# Patient Record
Sex: FEMALE | Race: WHITE | NOT HISPANIC OR LATINO | Employment: UNEMPLOYED | URBAN - NONMETROPOLITAN AREA
[De-identification: names, ages, dates, MRNs, and addresses within clinical notes are randomized per-mention and may not be internally consistent; named-entity substitution may affect disease eponyms.]

---

## 2017-03-27 ENCOUNTER — APPOINTMENT (OUTPATIENT)
Dept: LAB | Facility: HOSPITAL | Age: 57
End: 2017-03-27

## 2017-03-27 ENCOUNTER — TRANSCRIBE ORDERS (OUTPATIENT)
Dept: ADMINISTRATIVE | Facility: HOSPITAL | Age: 57
End: 2017-03-27

## 2017-03-27 DIAGNOSIS — R14.2 ERUCTATION: ICD-10-CM

## 2017-03-27 DIAGNOSIS — E66.9 OBESITY, UNSPECIFIED: ICD-10-CM

## 2017-03-27 DIAGNOSIS — I10 ESSENTIAL HYPERTENSION, MALIGNANT: Primary | ICD-10-CM

## 2017-03-27 DIAGNOSIS — E78.5 HYPERLIPIDEMIA, UNSPECIFIED HYPERLIPIDEMIA TYPE: ICD-10-CM

## 2017-03-27 LAB
ALBUMIN SERPL-MCNC: 4.4 G/DL (ref 3.5–5)
ALBUMIN/GLOB SERPL: 1.4 G/DL (ref 1.1–2.5)
ALP SERPL-CCNC: 78 U/L (ref 24–120)
ALT SERPL W P-5'-P-CCNC: 16 U/L (ref 0–54)
ANION GAP SERPL CALCULATED.3IONS-SCNC: 12 MMOL/L (ref 4–13)
ARTICHOKE IGE QN: 133 MG/DL (ref 0–99)
AST SERPL-CCNC: 20 U/L (ref 7–45)
BASOPHILS # BLD AUTO: 0.03 10*3/MM3 (ref 0–0.2)
BASOPHILS NFR BLD AUTO: 0.4 % (ref 0–2)
BILIRUB SERPL-MCNC: 0.4 MG/DL (ref 0.1–1)
BUN BLD-MCNC: 18 MG/DL (ref 5–21)
BUN/CREAT SERPL: 23.1 (ref 7–25)
CALCIUM SPEC-SCNC: 10.4 MG/DL (ref 8.4–10.4)
CHLORIDE SERPL-SCNC: 100 MMOL/L (ref 98–110)
CHOLEST SERPL-MCNC: 240 MG/DL (ref 130–200)
CO2 SERPL-SCNC: 31 MMOL/L (ref 24–31)
CREAT BLD-MCNC: 0.78 MG/DL (ref 0.5–1.4)
DEPRECATED RDW RBC AUTO: 41.9 FL (ref 40–54)
EOSINOPHIL # BLD AUTO: 0.11 10*3/MM3 (ref 0–0.7)
EOSINOPHIL NFR BLD AUTO: 1.6 % (ref 0–4)
ERYTHROCYTE [DISTWIDTH] IN BLOOD BY AUTOMATED COUNT: 13.6 % (ref 12–15)
GFR SERPL CREATININE-BSD FRML MDRD: 77 ML/MIN/1.73
GLOBULIN UR ELPH-MCNC: 3.1 GM/DL
GLUCOSE BLD-MCNC: 95 MG/DL (ref 70–100)
HCT VFR BLD AUTO: 38.8 % (ref 37–47)
HDLC SERPL-MCNC: 55 MG/DL
HGB BLD-MCNC: 13.2 G/DL (ref 12–16)
LDLC/HDLC SERPL: 2.46 {RATIO}
LYMPHOCYTES # BLD AUTO: 2.89 10*3/MM3 (ref 0.72–4.86)
LYMPHOCYTES NFR BLD AUTO: 43.3 % (ref 15–45)
MCH RBC QN AUTO: 29.9 PG (ref 28–32)
MCHC RBC AUTO-ENTMCNC: 34 G/DL (ref 33–36)
MCV RBC AUTO: 87.8 FL (ref 82–98)
MONOCYTES # BLD AUTO: 0.4 10*3/MM3 (ref 0.19–1.3)
MONOCYTES NFR BLD AUTO: 6 % (ref 4–12)
NEUTROPHILS # BLD AUTO: 3.24 10*3/MM3 (ref 1.87–8.4)
NEUTROPHILS NFR BLD AUTO: 48.7 % (ref 39–78)
PLATELET # BLD AUTO: 181 10*3/MM3 (ref 130–400)
PMV BLD AUTO: 11.2 FL (ref 6–12)
POTASSIUM BLD-SCNC: 4.8 MMOL/L (ref 3.5–5.3)
PROT SERPL-MCNC: 7.5 G/DL (ref 6.3–8.7)
RBC # BLD AUTO: 4.42 10*6/MM3 (ref 4.2–5.4)
SODIUM BLD-SCNC: 143 MMOL/L (ref 135–145)
TRIGL SERPL-MCNC: 248 MG/DL (ref 0–149)
TSH SERPL DL<=0.05 MIU/L-ACNC: 2.45 MIU/ML (ref 0.47–4.68)
WBC NRBC COR # BLD: 6.67 10*3/MM3 (ref 4.8–10.8)

## 2017-03-27 PROCEDURE — 80050 GENERAL HEALTH PANEL: CPT | Performed by: FAMILY MEDICINE

## 2017-03-27 PROCEDURE — 36415 COLL VENOUS BLD VENIPUNCTURE: CPT | Performed by: FAMILY MEDICINE

## 2017-03-27 PROCEDURE — 80061 LIPID PANEL: CPT | Performed by: FAMILY MEDICINE

## 2020-06-22 ENCOUNTER — TRANSCRIBE ORDERS (OUTPATIENT)
Dept: PHYSICAL THERAPY | Facility: CLINIC | Age: 60
End: 2020-06-22

## 2020-06-22 DIAGNOSIS — M76.62 ACHILLES TENDINITIS OF LEFT LOWER EXTREMITY: Primary | ICD-10-CM

## 2020-06-23 ENCOUNTER — TREATMENT (OUTPATIENT)
Dept: PHYSICAL THERAPY | Facility: CLINIC | Age: 60
End: 2020-06-23

## 2020-06-23 DIAGNOSIS — M76.62 ACHILLES TENDINITIS OF LEFT LOWER EXTREMITY: Primary | ICD-10-CM

## 2020-06-23 PROCEDURE — 97161 PT EVAL LOW COMPLEX 20 MIN: CPT | Performed by: PHYSICAL THERAPIST

## 2020-06-23 PROCEDURE — 97140 MANUAL THERAPY 1/> REGIONS: CPT | Performed by: PHYSICAL THERAPIST

## 2020-06-23 NOTE — PROGRESS NOTES
Outpatient Physical Therapy Ortho Initial Evaluation       Patient Name: Taylor Lopez  : 1960  MRN: [id number]  Today's Date: 2020      Visit Date: 2020    Patient Active Problem List   Diagnosis   • Acute dermatitis        Past Medical History:   Diagnosis Date   • Anxiety    • Elevated cholesterol    • Gallbladder abscess    • Hypertension         Past Surgical History:   Procedure Laterality Date   • CHOLECYSTECTOMY     • FRACTURE SURGERY     • OVARIAN CYST DRAINAGE/EXCISION         Visit Dx:     ICD-10-CM ICD-9-CM   1. Achilles tendinitis of left lower extremity M76.62 726.71         Patient History     Row Name 20 1300             History    Chief Complaint  Pain;Difficulty Walking  -WJ      Type of Pain  Ankle pain  -WJ      Brief Description of Current Complaint  Pt reports that she went to [facility] yesterday due to the pain in her L foot/ankle. She reports that about 2 weeks ago her dog kicked her in the back of the ankle. She reports that after going to the orthopedic doctor they diagnosed her with achilles tendinitis. She presents in a CAM boot that she got yesterday.  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  X-ray  -WJ      Results of Clinical Tests  negative   -WJ      Related/Recent Hospitalizations  No  -WJ         Pain     Pain Location  Ankle  -WJ      Pain at Present  3  -WJ      Pain at Best  3  -WJ      Pain at Worst  8  -WJ      Pain Frequency  Constant/continuous  -WJ      Pain Description  Sharp  -WJ      What Performance Factors Make the Current Problem(s) WORSE?  walking, going onto toes stretching  -WJ      What Performance Factors Make the Current Problem(s) BETTER?  CAM boot, ibuprofen   -WJ         Fall Risk Assessment    Any falls in the past year:  No  -WJ         Daily Activities    Pt Participated in POC and Goals  Yes  -WJ        User Key  (r) =  Recorded By, (t) = Taken By, (c) = Cosigned By    Initials Name Provider Type    WJ Terrance Tomlin, PT DPT Physical Therapist          PT Ortho     Row Name 06/23/20 1400       Posture/Observations    Posture/Observations Comments  slightly increased weight bearing RLE; CAM boot on R  -WJ       Quarter Clearing    Quarter Clearing  Lower Quarter Clearing  -WJ       DTR- Lower Quarter Clearing    Patellar tendon (L2-4)  Bilateral:;1- Minimal response  -WJ    Achilles tendon (S1-2)  Bilateral:;1- Minimal response  -WJ       Sensory Screen for Light Touch- Lower Quarter Clearing    L1 (inguinal area)  Bilateral:;Intact  -WJ    L2 (anterior mid thigh)  Bilateral:;Intact  -WJ    L3 (distal anterior thigh)  Bilateral:;Intact  -WJ    L4 (medial lower leg/foot)  Bilateral:;Intact  -WJ    L5 (lateral lower leg/great toe)  Bilateral:;Intact  -WJ    S1 (bottom of foot)  Bilateral:;Intact  -WJ       Myotomal Screen- Lower Quarter Clearing    Hip flexion (L2)  Bilateral:;5 (Normal)  -WJ    Knee extension (L3)  Bilateral:;5 (Normal)  -WJ    Ankle DF (L4)  Right:;5 (Normal);Left:;4 (Good)  -WJ    Ankle PF (S1)  Bilateral:;5 (Normal)  -WJ    Knee flexion (S2)  Bilateral:;5 (Normal)  -WJ       Special Tests/Palpation    Special Tests/Palpation  Ankle/Foot  -WJ       Foot/Ankle Palpation    Foot/Ankle Palpation?  Yes  -WJ    Medial Gastroc  Left:;Tender  -WJ    Lateral Gastroc  Left:;Tender  -WJ    Achilles' Tendon  Left:;Tender thickened  -WJ       Ankle Accessory Motions    Ankle Accessory Motions Tested?  Yes  -WJ    Talocrural (talotibial) A-P glide  Left:;Hypomobile  -WJ       General ROM    RT Lower Ext  Rt Ankle Dorsiflexion;Rt Ankle Plantarflexion;Rt Ankle Inversion;Rt Ankle Eversion  -WJ    LT Lower Ext  Lt Ankle Dorsiflexion;Lt Ankle Plantarflexion;Lt Ankle Inversion;Lt Ankle Eversion  -WJ       Right Lower Ext    Rt Ankle Dorsiflexion AROM  12  -WJ    Rt Ankle Plantarflexion AROM  46  -WJ    Rt Ankle Inversion AROM  42   -WJ    Rt Ankle Eversion AROM  14  -WJ       Left Lower Ext    Lt Ankle Dorsiflexion AROM  4  -WJ    Lt Ankle Plantarflexion AROM  38  -WJ    Lt Ankle Inversion AROM  20  -WJ    Lt Ankle Eversion AROM  18  -WJ       MMT (Manual Muscle Testing)    Rt Lower Ext  Rt Hip ABduction  -WJ    Lt Lower Ext  Lt Hip ABduction  -WJ       MMT Right Lower Ext    Rt Hip ABduction MMT, Gross Movement  (3+/5) fair plus  -WJ       MMT Left Lower Ext    Lt Hip ABduction MMT, Gross Movement  (3/5) fair  -WJ       Gait/Stairs Assessment/Training    Comment (Gait/Stairs)  Pt ambulates with a CAM boot on the L with increased pelvic sway  -WJ      User Key  (r) = Recorded By, (t) = Taken By, (c) = Cosigned By    Initials Name Provider Type    WTerrance Hill, PT DPT Physical Therapist                      Therapy Education  Education Details: Access code:[email or web address] ankle alphabet and seated gastroc strtech   Given: HEP, Symptoms/condition management, Posture/body mechanics, Fall prevention and home safety, Mobility training  Program: New  How Provided: Verbal, Demonstration  Provided to: Patient  Level of Understanding: Verbalized, Demonstrated     PT OP Goals     Row Name 06/23/20 1500          PT Short Term Goals    STG Date to Achieve  07/14/20  -WJ     STG 1  Pt is able to demonstrate proper weight shifting wihtout CAM boot.  -WJ     STG 1 Progress  New  -WJ     STG 2  Pt to dmeonstrate L active DF of 10 deg.  -WJ     STG 2 Progress  New  -W        Long Term Goals    LTG Date to Achieve  08/04/20  -WJ     LTG 1  Pt to demonstrate independence with comprehensive HEP.  -WJ     LTG 1 Progress  New  -WJ     LTG 2  Pt to demonstrate B hip abductor strength of 4/5.  -WJ     LTG 2 Progress  New  -WJ     LTG 3  Pt to score <10 on the LEFS.  -WJ     LTG 3 Progress  New  -WJ     LTG 4  Pt is able to ambulate community distances without the CAM boot while demonstrating good gait mechanics.  -WJ     LTG 4 Progress  New  -W        Time  Calculation    PT Goal Re-Cert Due Date  07/23/20  -       User Key  (r) = Recorded By, (t) = Taken By, (c) = Cosigned By    Initials Name Provider Type    WTerrance Hill, PT DPT Physical Therapist          PT Assessment/Plan     Row Name 06/23/20 1500          PT Assessment    Functional Limitations  Impaired gait;Impaired locomotion;Limitation in home management;Limitations in community activities;Performance in leisure activities  -     Impairments  Pain;Muscle strength;Range of motion;Joint mobility;Impaired muscle endurance  -W     Assessment Comments  Ms. Lopez presents to the clinic today with a chief complaint L Achilles pain onset approx 2 weeks following getting kicked by her dog. She went to [facility] yesterday and they placed her in a CAM boot in which she reports has helped her mobility. Evlaution revealed mild thickening of the tendon on the L compared to the R along with decreased ROM. There is also bilateral hip weakness placing increased strees throughout the lower extremities. Skilled physcial therapy is inidcated to address her ROM, flexibility, strength and gait mechanics. Thank you for this referral.  -WJ     Rehab Potential  Good  -     Patient/caregiver participated in establishment of treatment plan and goals  Yes  -WJ     Patient would benefit from skilled therapy intervention  Yes  -WJ        PT Plan    PT Frequency  2x/week  -W     Predicted Duration of Therapy Intervention (Therapy Eval)  6 weeks  -W     Planned CPT's?  PT EVAL LOW COMPLEXITY: 19362;PT RE-EVAL: 70567;PT THER PROC EA 15 MIN: 60641;PT THER ACT EA 15 MIN: 96324;PT MANUAL THERAPY EA 15 MIN: 03534;PT NEUROMUSC RE-EDUCATION EA 15 MIN: 24564;PT GAIT TRAINING EA 15 MIN: 88030;PT SELF CARE/HOME MGMT/TRAIN EA 15: 66196;PT ELECTRICAL STIM UNATTEND: ;PT ELECTRICAL STIM ATTD EA 15 MIN: 59494;PT ULTRASOUND EA 15 MIN: 24669;PT HOT/COLD PACK WC NONMCARE: 28450  -W     PT Plan Comments  We will initially work on her soft  tissue restrictions and L ankle mobility. We will progress with hip strengtheing and address her gait mechaincs slowly weaning off of the boot. An HEP will be developed progressing towards independence upon discharge.  -WJ       User Key  (r) = Recorded By, (t) = Taken By, (c) = Cosigned By    Initials Name Provider Type    Terrance Rea, PT DPT Physical Therapist          Modalities     Row Name 06/23/20 1500             ELECTRICAL STIMULATION    Attended/Unattended  Attended  -WJ      Stimulation Type  -- LaserStim   -WJ      Max mAmp  -- chronic inflammation   -WJ      Location/Electrode Placement/Other  Achilles/gastroc  -WJ      69474 - PT E-Stim Attended (Manual) Minutes  5  -WJ        User Key  (r) = Recorded By, (t) = Taken By, (c) = Cosigned By    Initials Name Provider Type    Terrance Rea, PT DPT Physical Therapist        OP Exercises     Row Name 06/23/20 1500             Total Minutes    64558 - PT Manual Therapy Minutes  10  -WJ        User Key  (r) = Recorded By, (t) = Taken By, (c) = Cosigned By    Initials Name Provider Type    Terrance Rea, PT DPT Physical Therapist           Manual Rx (last 36 hours)      Manual Treatments     Row Name 06/23/20 1500             Total Minutes    21765 - PT Manual Therapy Minutes  10  -WJ         Manual Rx 1    Manual Rx 1 Location  L achilles and gastroc  -WJ      Manual Rx 1 Type  IASTM with edge tool   -WJ      Manual Rx 1 Grade  mod   -WJ        User Key  (r) = Recorded By, (t) = Taken By, (c) = Cosigned By    Initials Name Provider Type    Terrance Rea, PT DPT Physical Therapist                      Outcome Measure Options: Lower Extremity Functional Scale (LEFS)  Lower Extremity Functional Index  Any of your usual work, housework or school activities: Quite a bit of difficulty  Your usual hobbies, recreational or sporting activities: Quite a bit of difficulty  Getting into or out of the bath: Moderate difficulty  Walking between rooms:  Moderate difficulty  Putting on your shoes or socks: A little bit of difficulty  Squatting: Extreme difficulty or unable to perform activity  Lifting an object, like a bag of groceries from the floor: No difficulty  Performing light activities around your home: Moderate difficulty  Performing heavy activities around your home: Moderate difficulty  Getting into or out of a car: Moderate difficulty  Walking 2 blocks: Quite a bit of difficulty  Walking a mile: Quite a bit of difficulty  Going up or down 10 stairs (about 1 flight of stairs): Quite a bit of difficulty  Standing for 1 hour: Moderate difficulty  Sitting for 1 hour: No difficulty  Running on even ground: Extreme difficulty or unable to perform activity  Running on uneven ground: Extreme difficulty or unable to perform activity  Making sharp turns while running fast: Extreme difficulty or unable to perform activity  Hopping: Extreme difficulty or unable to perform activity  Rolling over in bed: No difficulty  Total: 32      Time Calculation:               PT G-Codes  Outcome Measure Options: Lower Extremity Functional Scale (LEFS)  Total: 32         Terrance Tomlin, PT DPT  6/23/2020

## 2020-06-25 ENCOUNTER — TREATMENT (OUTPATIENT)
Dept: PHYSICAL THERAPY | Facility: CLINIC | Age: 60
End: 2020-06-25

## 2020-06-25 DIAGNOSIS — M76.62 ACHILLES TENDINITIS OF LEFT LOWER EXTREMITY: Primary | ICD-10-CM

## 2020-06-25 PROCEDURE — 97110 THERAPEUTIC EXERCISES: CPT | Performed by: PHYSICAL THERAPIST

## 2020-06-25 PROCEDURE — 97140 MANUAL THERAPY 1/> REGIONS: CPT | Performed by: PHYSICAL THERAPIST

## 2020-06-25 PROCEDURE — 97032 APPL MODALITY 1+ESTIM EA 15: CPT | Performed by: PHYSICAL THERAPIST

## 2020-06-25 NOTE — PROGRESS NOTES
Outpatient Physical Therapy Ortho Treatment Note       Patient Name: Taylor Lopez  : 1960  MRN: [id number]  Today's Date: 2020      Visit Date: 2020    Visit Dx:    ICD-10-CM ICD-9-CM   1. Achilles tendinitis of left lower extremity M76.62 726.71       Patient Active Problem List   Diagnosis   • Acute dermatitis        Past Medical History:   Diagnosis Date   • Anxiety    • Elevated cholesterol    • Gallbladder abscess    • Hypertension         Past Surgical History:   Procedure Laterality Date   • CHOLECYSTECTOMY     • FRACTURE SURGERY     • OVARIAN CYST DRAINAGE/EXCISION                         PT Assessment/Plan     Row Name 20 0900          PT Assessment    Assessment Comments  Today was her first visit since the initial eval therefore no goals have been met at this time. We focused today on ankle mobility along with her soft tissue restrictions. She had a couple of adhesions along the medial side of her gastroc.  -WJ        PT Plan    PT Plan Comments  Continue to address her soft iissue restrictions and joinbt mobility.  -WJ       User Key  (r) = Recorded By, (t) = Taken By, (c) = Cosigned By    Initials Name Provider Type    Terrance Rea, PT DPT Physical Therapist          Modalities     Row Name 20             ELECTRICAL STIMULATION    Attended/Unattended  Attended  -WJ      Stimulation Type  -- LaserStim  -WJ      Max mAmp  -- chronic inflammation  -WJ      Location/Electrode Placement/Other  Achilles/gastroc  -WJ      30476 - PT E-Stim Attended (Manual) Minutes  10  -WJ        User Key  (r) = Recorded By, (t) = Taken By, (c) = Cosigned By    Initials Name Provider Type    Terrance Rea, PT DPT Physical Therapist        OP Exercises     Row Name 20 08          Subjective Comments    Subjective Comments  --  Pt reports compliance with HEP and reports that as long as she has the boot on her pain is controlled.  -WJ        Subjective Pain     Able to rate subjective pain?  --  yes  -WJ     Pre-Treatment Pain Level  --  2  -WJ        Total Minutes    61824 - PT Therapeutic Exercise Minutes  10  -WJ  --     80093 - PT Manual Therapy Minutes  25  -WJ  --        Exercise 1    Exercise Name 1  --  L gastroc stretch  -WJ     Cueing 1  --  Verbal;Tactile  -WJ     Reps 1  --  3  -WJ     Time 1  --  30 sec each  -WJ        Exercise 2    Exercise Name 2  --  ankle 4-way with YTB  -WJ     Cueing 2  --  Verbal;Tactile  -WJ     Sets 2  --  2  -WJ     Reps 2  --  10  -WJ       User Key  (r) = Recorded By, (t) = Taken By, (c) = Cosigned By    Initials Name Provider Type    Terrance Rea, PT DPT Physical Therapist                      Manual Rx (last 36 hours)      Manual Treatments     Row Name 06/25/20 0900             Total Minutes    18712 - PT Manual Therapy Minutes  25  -WJ         Manual Rx 1    Manual Rx 1 Location  L achilles and gastroc  -WJ      Manual Rx 1 Type  IASTM with edge tool   -WJ      Manual Rx 1 Grade  mod   -WJ         Manual Rx 2    Manual Rx 2 Location  L TC  -WJ      Manual Rx 2 Type  dorsal glide  -WJ         Manual Rx 3    Manual Rx 3 Location  L TC  -WJ      Manual Rx 3 Type  distraction  -WJ        User Key  (r) = Recorded By, (t) = Taken By, (c) = Cosigned By    Initials Name Provider Type    Terrance Rea, PT DPT Physical Therapist          PT OP Goals     Row Name 06/25/20 0846          PT Short Term Goals    STG Date to Achieve  07/14/20  -WJ     STG 1  Pt is able to demonstrate proper weight shifting wihtout CAM boot.  -WJ     STG 1 Progress  Ongoing  -WJ     STG 2  Pt to dmeonstrate L active DF of 10 deg.  -WJ     STG 2 Progress  Ongoing  -WJ        Long Term Goals    LTG Date to Achieve  08/04/20  -WJ     LTG 1  Pt to demonstrate independence with comprehensive HEP.  -WJ     LTG 1 Progress  Ongoing  -WJ     LTG 2  Pt to demonstrate B hip abductor strength of 4/5.  -WJ     LTG 2 Progress  Ongoing  -WJ     LTG 3  Pt to  score <10 on the LEFS.  -WJ     LTG 3 Progress  Ongoing  -WJ     LTG 4  Pt is able to ambulate community distances without the CAM boot while demonstrating good gait mechanics.  -WJ     LTG 4 Progress  Ongoing  -WJ        Time Calculation    PT Goal Re-Cert Due Date  06/23/20  -WJ       User Key  (r) = Recorded By, (t) = Taken By, (c) = Cosigned By    Initials Name Provider Type    Terrance Rea, PT DPT Physical Therapist          Therapy Education  Given: HEP, Symptoms/condition management, Posture/body mechanics, Fall prevention and home safety, Mobility training  Program: Reinforced  How Provided: Verbal, Demonstration  Provided to: Patient  Level of Understanding: Verbalized, Demonstrated              Time Calculation:   Total Timed Code Minutes- PT: 45 minute(s)                Terrance Tomlin, PT DPT  6/25/2020

## 2020-06-30 ENCOUNTER — TREATMENT (OUTPATIENT)
Dept: PHYSICAL THERAPY | Facility: CLINIC | Age: 60
End: 2020-06-30

## 2020-06-30 DIAGNOSIS — M76.62 ACHILLES TENDINITIS OF LEFT LOWER EXTREMITY: Primary | ICD-10-CM

## 2020-06-30 PROCEDURE — 97032 APPL MODALITY 1+ESTIM EA 15: CPT | Performed by: PHYSICAL THERAPIST

## 2020-06-30 PROCEDURE — 97140 MANUAL THERAPY 1/> REGIONS: CPT | Performed by: PHYSICAL THERAPIST

## 2020-06-30 PROCEDURE — 97110 THERAPEUTIC EXERCISES: CPT | Performed by: PHYSICAL THERAPIST

## 2020-06-30 NOTE — PROGRESS NOTES
Outpatient Physical Therapy Ortho Treatment Note       Patient Name: Taylor Lopez  : 1960  MRN: [id number]  Today's Date: 2020      Visit Date: 2020    Visit Dx:    ICD-10-CM ICD-9-CM   1. Achilles tendinitis of left lower extremity M76.62 726.71       Patient Active Problem List   Diagnosis   • Acute dermatitis        Past Medical History:   Diagnosis Date   • Anxiety    • Elevated cholesterol    • Gallbladder abscess    • Hypertension         Past Surgical History:   Procedure Laterality Date   • CHOLECYSTECTOMY     • FRACTURE SURGERY     • OVARIAN CYST DRAINAGE/EXCISION                         PT Assessment/Plan     Row Name 20 0846       PT Assessment    Assessment Comments  We continued to utilize LASERstim to promote healing in her L achilles, work to reduce guarding in her medial gastrocs, and increase L ankle ROM. We also began light hip abd strengthening which she did very well with.   -  --  -       PT Plan    PT Plan Comments  Continue to increase L ankle ROM & decrease soft tissue restrictions. Progress hip strengthening and consider light WBing activities w/out boot.   -  --  -      User Key  (r) = Recorded By, (t) = Taken By, (c) = Cosigned By    Initials Name Provider Type    Michela Dolan PTA Physical Therapy Assistant          Modalities     Row Name 20             ELECTRICAL STIMULATION    Attended/Unattended  Attended  -      Stimulation Type  -- LASERstim  -      Max mAmp  -- chronic-inflammation mode  -      Location/Electrode Placement/Other  L achilles/distal gastroc  -      21623 - PT E-Stim Attended (Manual) Minutes  10  -        User Key  (r) = Recorded By, (t) = Taken By, (c) = Cosigned By    Initials Name Provider Type    Michela Dolan PTA Physical Therapy Assistant        OP Exercises     Row Name 20             Subjective Comments    Subjective Comments  Pt. feels a little better. She walked  around her house a little last night without the boot.   -         Subjective Pain    Able to rate subjective pain?  yes  -      Pre-Treatment Pain Level  2  -      Post-Treatment Pain Level  2  -         Total Minutes    62747 - PT Therapeutic Exercise Minutes  18  -AH      45949 - PT Manual Therapy Minutes  12  -AH         Exercise 1    Exercise Name 1  L gastroc/soleus stretches in prone  -      Cueing 1  Verbal;Tactile  -      Time 1  3 min each  -         Exercise 2    Exercise Name 2  BKFO  -      Cueing 2  Verbal;Tactile  -      Sets 2  2  -AH      Reps 2  10  -AH         Exercise 3    Exercise Name 3  L DF/PF on wobble board seated  -      Cueing 3  Verbal;Demo  -      Sets 3  2  -AH      Reps 3  10  -AH         Exercise 4    Exercise Name 4  B standing hip abd  -      Cueing 4  Verbal;Demo  -      Sets 4  2  -AH      Reps 4  10  -AH        User Key  (r) = Recorded By, (t) = Taken By, (c) = Cosigned By    Initials Name Provider Type     Michela Ayobu PTA Physical Therapy Assistant                      Manual Rx (last 36 hours)      Manual Treatments     Row Name 06/30/20 0930             Total Minutes    75138 - PT Manual Therapy Minutes  12  -AH         Manual Rx 1    Manual Rx 1 Location  L achilles and gastroc  -AH      Manual Rx 1 Type  IASTM with HCF tool   -        User Key  (r) = Recorded By, (t) = Taken By, (c) = Cosigned By    Initials Name Provider Type     Michela Ayoub, AUBREY Physical Therapy Assistant          PT OP Goals     Row Name 06/30/20 0930          PT Short Term Goals    STG Date to Achieve  07/14/20  -     STG 1  Pt is able to demonstrate proper weight shifting wihtout CAM boot.  -     STG 1 Progress  Ongoing  -     STG 1 Progress Comments  Pt. reports she has been walking some w/out the boot.   -     STG 2  Pt to dmeonstrate L active DF of 10 deg.  -     STG 2 Progress  Ongoing  -        Long Term Goals    LTG Date to Achieve  08/04/20   -     LTG 1  Pt to demonstrate independence with comprehensive HEP.  -     LTG 1 Progress  Ongoing  -     LTG 2  Pt to demonstrate B hip abductor strength of 4/5.  -     LTG 2 Progress  Ongoing  -     LTG 3  Pt to score <10 on the LEFS.  -     LTG 3 Progress  Ongoing  -     LTG 4  Pt is able to ambulate community distances without the CAM boot while demonstrating good gait mechanics.  -     LTG 4 Progress  Ongoing  -        Time Calculation    PT Goal Re-Cert Due Date  07/23/20  -       User Key  (r) = Recorded By, (t) = Taken By, (c) = Cosigned By    Initials Name Provider Type    Michela Dolan PTA Physical Therapy Assistant          Therapy Education  Given: HEP, Symptoms/condition management  Program: Reinforced  How Provided: Verbal, Demonstration  Provided to: Patient  Level of Understanding: Verbalized              Time Calculation:   PT Non-Billable Time (min): 6 min(pt in BR)  Total Timed Code Minutes- PT: 46 minute(s)                Michela Ayoub PTA  6/30/2020

## 2020-07-02 ENCOUNTER — TREATMENT (OUTPATIENT)
Dept: PHYSICAL THERAPY | Facility: CLINIC | Age: 60
End: 2020-07-02

## 2020-07-02 DIAGNOSIS — M76.62 ACHILLES TENDINITIS OF LEFT LOWER EXTREMITY: Primary | ICD-10-CM

## 2020-07-02 PROCEDURE — 97140 MANUAL THERAPY 1/> REGIONS: CPT | Performed by: PHYSICAL THERAPIST

## 2020-07-02 PROCEDURE — 97110 THERAPEUTIC EXERCISES: CPT | Performed by: PHYSICAL THERAPIST

## 2020-07-02 PROCEDURE — 97032 APPL MODALITY 1+ESTIM EA 15: CPT | Performed by: PHYSICAL THERAPIST

## 2020-07-02 NOTE — PROGRESS NOTES
Outpatient Physical Therapy Ortho Treatment Note       Patient Name: Taylor Lopez  : 1960  MRN: [id number]  Today's Date: 2020      Visit Date: 2020    Visit Dx:    ICD-10-CM ICD-9-CM   1. Achilles tendinitis of left lower extremity M76.62 726.71       Patient Active Problem List   Diagnosis   • Acute dermatitis        Past Medical History:   Diagnosis Date   • Anxiety    • Elevated cholesterol    • Gallbladder abscess    • Hypertension         Past Surgical History:   Procedure Laterality Date   • CHOLECYSTECTOMY     • FRACTURE SURGERY     • OVARIAN CYST DRAINAGE/EXCISION                         PT Assessment/Plan     Row Name 20 08          PT Assessment    Assessment Comments  Pt reports that she has been ambulating some without the boot at home in the house and yard. We have discussed the need to wean her off of the boot to be the mosteffective. She did report increased pain from walking at hoem in the yard without the boot today. Her gastroc flexibility has improved and there were decreased adhesion noted today.  -WJ        PT Plan    PT Plan Comments  Continue to work on her gastroc flexibuility, ankle ROM, and progress with weight shifting and hip strengthening.  -WJ       User Key  (r) = Recorded By, (t) = Taken By, (c) = Cosigned By    Initials Name Provider Type    Terrance Rea, CONNIE DPT Physical Therapist          Modalities     Row Name 20 08             ELECTRICAL STIMULATION    Attended/Unattended  Attended  -WJ      Stimulation Type  -- LaserStim  -WJ      Max mAmp  -- chronic inflammation  -WJ      Location/Electrode Placement/Other  L achilles/distal gastroc  -WJ      36551 - PT E-Stim Attended (Manual) Minutes  10  -WJ        User Key  (r) = Recorded By, (t) = Taken By, (c) = Cosigned By    Initials Name Provider Type    Terrance Rea, PT DPT Physical Therapist        OP Exercises     Row Name 20          Subjective Comments     Subjective Comments  Pt reports that she has been walking around the house some this morning without the boot.  -WJ  --        Subjective Pain    Able to rate subjective pain?  yes  -WJ  --     Pre-Treatment Pain Level  4  -WJ  --     Post-Treatment Pain Level  4  -WJ  --        Total Minutes    08074 - PT Therapeutic Exercise Minutes  --  18  -WJ     84832 - PT Manual Therapy Minutes  --  15  -WJ        Exercise 1    Exercise Name 1  L gastroc stretch in prone  -WJ  --     Cueing 1  Verbal;Tactile  -WJ  --     Reps 1  3  -WJ  --     Time 1  30 sec each  -WJ  --        Exercise 2    Exercise Name 2  B clamshells  -WJ  --     Cueing 2  Verbal  -WJ  --     Sets 2  2  -WJ  --     Reps 2  10  -WJ  --     Additional Comments  RTB  -WJ  --        Exercise 3    Exercise Name 3  DF/PF on wobble board in standing  -WJ  --     Cueing 3  Verbal;Demo  -WJ  --     Reps 3  2  -WJ  --     Time 3  1 min each   -WJ  --        Exercise 4    Exercise Name 4  staggered LLE A/P weigth shifting on BOSU  -WJ  --     Cueing 4  Verbal;Demo  -WJ  --     Sets 4  2  -WJ  --     Reps 4  10  -WJ  --       User Key  (r) = Recorded By, (t) = Taken By, (c) = Cosigned By    Initials Name Provider Type    WTerrance Hill, PT DPT Physical Therapist                      Manual Rx (last 36 hours)      Manual Treatments     Row Name 07/02/20 0700             Total Minutes    47514 - PT Manual Therapy Minutes  15  -WJ         Manual Rx 1    Manual Rx 1 Location  L achilles and gastroc  -WJ      Manual Rx 1 Type  IASTM with edge tool  -WJ         Manual Rx 2    Manual Rx 2 Location  L TC  -WJ      Manual Rx 2 Type  dorsal glide  -WJ        User Key  (r) = Recorded By, (t) = Taken By, (c) = Cosigned By    Initials Name Provider Type    WTerrance Hill, PT DPT Physical Therapist          PT OP Goals     Row Name 07/02/20 0759          PT Short Term Goals    STG Date to Achieve  07/14/20  -WJ     STG 1  Pt is able to demonstrate proper weight shifting  wihtout CAM boot.  -WJ     STG 1 Progress  Ongoing  -W     STG 2  Pt to dmeonstrate L active DF of 10 deg.  -W     STG 2 Progress  Ongoing  -W        Long Term Goals    LTG Date to Achieve  08/04/20  -WJ     LTG 1  Pt to demonstrate independence with comprehensive HEP.  -WJ     LTG 1 Progress  Ongoing  -WJ     LTG 2  Pt to demonstrate B hip abductor strength of 4/5.  -WJ     LTG 2 Progress  Ongoing  -WJ     LTG 3  Pt to score <10 on the LEFS.  -WJ     LTG 3 Progress  Ongoing  -WJ     LTG 4  Pt is able to ambulate community distances without the CAM boot while demonstrating good gait mechanics.  -W     LTG 4 Progress  Ongoing  -        Time Calculation    PT Goal Re-Cert Due Date  07/23/20  -W       User Key  (r) = Recorded By, (t) = Taken By, (c) = Cosigned By    Initials Name Provider Type    Terrance Hill, PT DPT Physical Therapist          Therapy Education  Given: HEP, Symptoms/condition management  Program: Reinforced  How Provided: Verbal, Demonstration  Provided to: Patient  Level of Understanding: Verbalized              Time Calculation:   Total Timed Code Minutes- PT: 43 minute(s)                Terrance Tomlin, PT DPT  7/2/2020

## 2020-07-07 ENCOUNTER — TREATMENT (OUTPATIENT)
Dept: PHYSICAL THERAPY | Facility: CLINIC | Age: 60
End: 2020-07-07

## 2020-07-07 DIAGNOSIS — M76.62 ACHILLES TENDINITIS OF LEFT LOWER EXTREMITY: Primary | ICD-10-CM

## 2020-07-07 PROCEDURE — 97110 THERAPEUTIC EXERCISES: CPT | Performed by: PHYSICAL THERAPIST

## 2020-07-07 PROCEDURE — 97140 MANUAL THERAPY 1/> REGIONS: CPT | Performed by: PHYSICAL THERAPIST

## 2020-07-07 NOTE — PROGRESS NOTES
Outpatient Physical Therapy Ortho Treatment Note       Patient Name: Taylor Lopez  : 1960  MRN: [id number]  Today's Date: 2020      Visit Date: 2020    Visit Dx:    ICD-10-CM ICD-9-CM   1. Achilles tendinitis of left lower extremity M76.62 726.71       Patient Active Problem List   Diagnosis   • Acute dermatitis        Past Medical History:   Diagnosis Date   • Anxiety    • Elevated cholesterol    • Gallbladder abscess    • Hypertension         Past Surgical History:   Procedure Laterality Date   • CHOLECYSTECTOMY     • FRACTURE SURGERY     • OVARIAN CYST DRAINAGE/EXCISION                         PT Assessment/Plan     Row Name 20 1000          PT Assessment    Assessment Comments  Pt reports that she has chosen to discontinue wearing her boot. She was educated on proper wear scheduling for the boot and encouraged to continue wearing it especially during higher activity time, such as walking. Ankle ROM continues to improve.Pt reports that her pain has decreased and muscle fatigue is still an issue that it is also improving.  -WJ        PT Plan    PT Plan Comments  We will continue to monitor her symptoms and response to being out of the boot. Progress with hip strengthening along with increased loading of the tendon.  -WJ       User Key  (r) = Recorded By, (t) = Taken By, (c) = Cosigned By    Initials Name Provider Type    WTerrance Hill, PT DPT Physical Therapist            OP Exercises     Row Name 20 0900             Subjective Comments    Subjective Comments  Pt reports that ankle is improving and that she feels that she no longer needs the boot.  -WJ         Subjective Pain    Pre-Treatment Pain Level  2  -WJ      Post-Treatment Pain Level  2  -WJ         Total Minutes    98323 - PT Therapeutic Exercise Minutes  23  -WJ      00641 - PT Manual Therapy Minutes  20  -WJ         Exercise 1    Exercise Name 1  L Baps board circles   -WJ      Cueing 1  Verbal;Tactile  -WJ       Sets 1  3 each direction  -WJ      Reps 1  10  -WJ      Additional Comments  level 3 baps   -WJ         Exercise 2    Exercise Name 2  ankle 4-way w/ RTB  -WJ      Cueing 2  Verbal  -WJ      Sets 2  2  -WJ      Reps 2  10  -WJ         Exercise 3    Exercise Name 3  Standing hip abduction  -WJ      Cueing 3  Verbal  -WJ      Sets 3  2  -WJ      Reps 3  10  -WJ         Exercise 4    Exercise Name 4  BOSU mini side lunge  -WJ      Cueing 4  Verbal  -WJ      Sets 4  2  -WJ      Reps 4  10  -WJ        User Key  (r) = Recorded By, (t) = Taken By, (c) = Cosigned By    Initials Name Provider Type    WTerrance Hill, PT DPT Physical Therapist                      Manual Rx (last 36 hours)      Manual Treatments     Row Name 07/07/20 0900             Total Minutes    52305 - PT Manual Therapy Minutes  20  -WJ         Manual Rx 1    Manual Rx 1 Location  L achilles and gastroc  -WJ      Manual Rx 1 Type  IASTM with edge tool  -WJ         Manual Rx 2    Manual Rx 2 Location  L TC  -WJ      Manual Rx 2 Type  dorsal glide/distraction  -WJ        User Key  (r) = Recorded By, (t) = Taken By, (c) = Cosigned By    Initials Name Provider Type    WJ Terrance Tomlin, PT DPT Physical Therapist          PT OP Goals     Row Name 07/07/20 0900          PT Short Term Goals    STG Date to Achieve  07/14/20  -WJ     STG 1  Pt is able to demonstrate proper weight shifting wihtout CAM boot.  -WJ     STG 1 Progress  Ongoing  -WJ     STG 1 Progress Comments  Pt reports that she is no longer wearing the boot.  -WJ     STG 2  Pt to dmeonstrate L active DF of 10 deg.  -WJ     STG 2 Progress  Ongoing  -WJ        Long Term Goals    LTG Date to Achieve  08/04/20  -WJ     LTG 1  Pt to demonstrate independence with comprehensive HEP.  -WJ     LTG 1 Progress  Ongoing  -WJ     LTG 2  Pt to demonstrate B hip abductor strength of 4/5.  -WJ     LTG 2 Progress  Ongoing  -WJ     LTG 3  Pt to score <10 on the LEFS.  -WJ     LTG 3 Progress  Ongoing  -WJ     LTG  4  Pt is able to ambulate community distances without the CAM boot while demonstrating good gait mechanics.  -WKELECHI     LTG 4 Progress  Ongoing  -WJ        Time Calculation    PT Goal Re-Cert Due Date  07/23/20  -WJ       User Key  (r) = Recorded By, (t) = Taken By, (c) = Cosigned By    Initials Name Provider Type    WTerrance Hill, PT DPT Physical Therapist          Therapy Education  Education Details: Education on boot and proper wear  Given: HEP, Symptoms/condition management  Program: Reinforced  How Provided: Verbal, Demonstration  Provided to: Patient  Level of Understanding: Verbalized              Time Calculation:   Total Timed Code Minutes- PT: 43 minute(s)                Terrance Tomlin, PT DPT  7/7/2020

## 2020-07-09 ENCOUNTER — TREATMENT (OUTPATIENT)
Dept: PHYSICAL THERAPY | Facility: CLINIC | Age: 60
End: 2020-07-09

## 2020-07-09 DIAGNOSIS — M76.62 ACHILLES TENDINITIS OF LEFT LOWER EXTREMITY: Primary | ICD-10-CM

## 2020-07-09 PROCEDURE — 97140 MANUAL THERAPY 1/> REGIONS: CPT | Performed by: PHYSICAL THERAPIST

## 2020-07-09 PROCEDURE — 97032 APPL MODALITY 1+ESTIM EA 15: CPT | Performed by: PHYSICAL THERAPIST

## 2020-07-09 PROCEDURE — 97110 THERAPEUTIC EXERCISES: CPT | Performed by: PHYSICAL THERAPIST

## 2020-07-09 NOTE — PROGRESS NOTES
Outpatient Physical Therapy Ortho Treatment Note       Patient Name: Taylor Lopez  : 1960  MRN: [id number]  Today's Date: 2020      Visit Date: 2020    Visit Dx:    ICD-10-CM ICD-9-CM   1. Achilles tendinitis of left lower extremity M76.62 726.71       Patient Active Problem List   Diagnosis   • Acute dermatitis        Past Medical History:   Diagnosis Date   • Anxiety    • Elevated cholesterol    • Gallbladder abscess    • Hypertension         Past Surgical History:   Procedure Laterality Date   • CHOLECYSTECTOMY     • FRACTURE SURGERY     • OVARIAN CYST DRAINAGE/EXCISION                         PT Assessment/Plan     Row Name 20 09          PT Assessment    Assessment Comments  She over did it and ran errands after her last session without her CAM walking boot. I advised her to reduce the time otu of her CAM walking boot incrementally.  -EC        PT Plan    PT Plan Comments  Continue educating patient about her progression and monitor her activity level.  -EC       User Key  (r) = Recorded By, (t) = Taken By, (c) = Cosigned By    Initials Name Provider Type    Dmitriy Lenz PTA Physical Therapy Assistant          Modalities     Row Name 20 08             ELECTRICAL STIMULATION    Attended/Unattended  Attended  -EC      Stimulation Type  -- LaserStim  -EC      Max mAmp  -- chronic inflammation  -EC      Location/Electrode Placement/Other  L achilles, TCJ  -EC      10877 - PT E-Stim Attended (Manual) Minutes  10  -EC        User Key  (r) = Recorded By, (t) = Taken By, (c) = Cosigned By    Initials Name Provider Type    Dmitriy Lenz PTA Physical Therapy Assistant        OP Exercises     Row Name 20 0820 07       Subjective Comments    Subjective Comments  --  Pt reports having pain because she admits she didn't wear boot. Also reports having pain in both hips.  -EC  --       Subjective Pain    Able to rate subjective pain?  --  yes   -EC  --    Pre-Treatment Pain Level  --  3  -EC  --    Post-Treatment Pain Level  --  2  -EC  --       Total Minutes    08929 - PT Therapeutic Exercise Minutes  21  -EC  --  --    92963 - PT Manual Therapy Minutes  --  --  12  -EC       Exercise 1    Exercise Name 1  L Baps board circles   -EC  --  --    Cueing 1  Verbal;Tactile  -EC  --  --    Sets 1  3 each direction  -EC  --  --    Reps 1  20  -EC  --  --       Exercise 2    Exercise Name 2  minilunge on blue foam  -EC  --  --    Reps 2  15  -EC  --  --      User Key  (r) = Recorded By, (t) = Taken By, (c) = Cosigned By    Initials Name Provider Type    Dmitriy Lenz PTA Physical Therapy Assistant                      Manual Rx (last 36 hours)      Manual Treatments     Row Name 07/09/20 0700             Total Minutes    84845 - PT Manual Therapy Minutes  12  -EC         Manual Rx 1    Manual Rx 1 Location  L first MTP  -EC      Manual Rx 1 Type  AP mobilization  -EC      Manual Rx 1 Grade  2  -EC      Manual Rx 1 Duration  2  -EC         Manual Rx 2    Manual Rx 2 Location  L plantar surface  -EC      Manual Rx 2 Type  DFR  -EC      Manual Rx 2 Duration  5  -EC         Manual Rx 3    Manual Rx 3 Location  L TCJ  -EC      Manual Rx 3 Type  A/P mobilizations with wedge  -EC      Manual Rx 3 Grade  2  -EC      Manual Rx 3 Duration  5  -EC        User Key  (r) = Recorded By, (t) = Taken By, (c) = Cosigned By    Initials Name Provider Type    Dmitriy Lenz PTA Physical Therapy Assistant          PT OP Goals     Row Name 07/09/20 0800          PT Short Term Goals    STG Date to Achieve  07/14/20  -EC     STG 1  Pt is able to demonstrate proper weight shifting wihtout CAM boot.  -EC     STG 1 Progress  Ongoing  -EC     STG 2  Pt to dmeonstrate L active DF of 10 deg.  -EC     STG 2 Progress  Ongoing  -EC        Long Term Goals    LTG Date to Achieve  08/04/20  -EC     LTG 1  Pt to demonstrate independence with comprehensive HEP.  -EC     LTG 1 Progress   Ongoing  -EC     LTG 2  Pt to demonstrate B hip abductor strength of 4/5.  -EC     LTG 2 Progress  Ongoing  -EC     LTG 3  Pt to score <10 on the LEFS.  -EC     LTG 3 Progress  Ongoing  -EC     LTG 4  Pt is able to ambulate community distances without the CAM boot while demonstrating good gait mechanics.  -EC     LTG 4 Progress  Ongoing  -EC     LTG 4 Progress Comments  ran errands without CAM boot after the last session and has had increased pain  -EC       User Key  (r) = Recorded By, (t) = Taken By, (c) = Cosigned By    Initials Name Provider Type    Dmitriy Lenz, AUBREY Physical Therapy Assistant          Therapy Education  Education Details: Proper wearing of boot to reduce pain.  Given: Symptoms/condition management  Program: Reinforced  How Provided: Verbal  Provided to: Patient  Level of Understanding: Verbalized              Time Calculation:                    Dmitriy Call PTA  7/9/2020

## 2020-07-13 ENCOUNTER — TREATMENT (OUTPATIENT)
Dept: PHYSICAL THERAPY | Facility: CLINIC | Age: 60
End: 2020-07-13

## 2020-07-13 DIAGNOSIS — M76.62 ACHILLES TENDINITIS OF LEFT LOWER EXTREMITY: Primary | ICD-10-CM

## 2020-07-13 PROCEDURE — 97110 THERAPEUTIC EXERCISES: CPT | Performed by: PHYSICAL THERAPIST

## 2020-07-13 PROCEDURE — 97140 MANUAL THERAPY 1/> REGIONS: CPT | Performed by: PHYSICAL THERAPIST

## 2020-07-13 NOTE — PROGRESS NOTES
Outpatient Physical Therapy Ortho Treatment Note       Patient Name: Taylor Lopez  : 1960  MRN: [id number]  Today's Date: 2020      Visit Date: 2020    Visit Dx:    ICD-10-CM ICD-9-CM   1. Achilles tendinitis of left lower extremity M76.62 726.71       Patient Active Problem List   Diagnosis   • Acute dermatitis        Past Medical History:   Diagnosis Date   • Anxiety    • Elevated cholesterol    • Gallbladder abscess    • Hypertension         Past Surgical History:   Procedure Laterality Date   • CHOLECYSTECTOMY     • FRACTURE SURGERY     • OVARIAN CYST DRAINAGE/EXCISION                         PT Assessment/Plan     Row Name 20 0900          PT Assessment    Assessment Comments  Pt states she has been wearing boot more often after experiencing pain from not wearing it before last session. Still presents with tenderness in L LE and demonstrates an antalgic gait. Pt needs increasing of L ankle DF and B hip strengthening.  -EC        PT Plan    PT Plan Comments  Continue using stretches of LLE DF. Review standing doorway stretch to test compentency to add to HEP. Also continue IASTM for soft tissue restrictions.  -EC       User Key  (r) = Recorded By, (t) = Taken By, (c) = Cosigned By    Initials Name Provider Type    EC Dmitriy Call, PTA Physical Therapy Assistant            OP Exercises     Row Name 20 0800 20 0700          Subjective Comments    Subjective Comments  Pt reports wearing the boot whenever she leaves. Leaves off when home except when walking much.  -EC  --        Subjective Pain    Able to rate subjective pain?  yes  -EC  --     Pre-Treatment Pain Level  2  -EC  --     Post-Treatment Pain Level  2  -EC  --        Total Minutes    95524 - PT Therapeutic Exercise Minutes  30  -EC  --     22382 - PT Manual Therapy Minutes  --  15  -EC        Exercise 1    Exercise Name 1  progressive B DF on wedges  -EC  --     Cueing 1  Demo;Verbal  -EC  --     Sets 1  1   -EC  --     Time 1  30 sec  -EC  --     Additional Comments  4 progressive placements  -EC  --        Exercise 2    Exercise Name 2  standing lateral weight shift on theradisks  -EC  --     Cueing 2  Verbal;Demo  -EC  --     Sets 2  3  -EC  --     Time 2  30s  -EC  --        Exercise 3    Exercise Name 3  standing A/P weight shift on blue foam  -EC  --     Cueing 3  Verbal;Demo  -EC  --     Sets 3  2  -EC  --     Time 3  30 sec  -EC  --        Exercise 4    Exercise Name 4  SLS   -EC  --     Cueing 4  Verbal;Demo  -EC  --     Sets 4  2  -EC  --     Time 4  30s   -EC  --     Additional Comments  each side  -EC  --        Exercise 5    Exercise Name 5  SLS on theradiskj  -EC  --     Cueing 5  Verbal;Demo  -EC  --     Sets 5  2  -EC  --     Time 5  30s  -EC  --     Additional Comments  each side  -EC  --        Exercise 6    Exercise Name 6  B DF stretch on wedges  -EC  --     Sets 6  1  -EC  --     Reps 6  30  -EC  --     Additional Comments  all the way forward.  -EC  --        Exercise 7    Exercise Name 7  standing doorway L achilles stretch   -EC  --     Reps 7  2  -EC  --     Time 7  30s  -EC  --     Additional Comments  left side posterior only  -EC  --       User Key  (r) = Recorded By, (t) = Taken By, (c) = Cosigned By    Initials Name Provider Type    EC Dmitriy Call, PTA Physical Therapy Assistant                      Manual Rx (last 36 hours)      Manual Treatments     Row Name 07/13/20 0700             Total Minutes    73185 - PT Manual Therapy Minutes  15  -EC         Manual Rx 1    Manual Rx 1 Location  L TC  -EC      Manual Rx 1 Type  A/P mobilizations in reclined long sitting  -EC      Manual Rx 1 Grade  2  -EC      Manual Rx 1 Duration  5 min  -EC         Manual Rx 2    Manual Rx 2 Location  L posterior calf  -EC      Manual Rx 2 Type  IASTM with edge tool in R SL  -EC      Manual Rx 2 Grade  mod  -EC      Manual Rx 2 Duration  10  -EC        User Key  (r) = Recorded By, (t) = Taken By, (c) =  Cosigned By    Initials Name Provider Type    EC Dmitriy Call PTA Physical Therapy Assistant          PT OP Goals     Row Name 07/13/20 0900 07/13/20 0800       PT Short Term Goals    STG Date to Achieve  07/14/20  -EC  07/14/20  -EC    STG 1  Pt is able to demonstrate proper weight shifting wihtout CAM boot.  -EC  Pt is able to demonstrate proper weight shifting wihtout CAM boot.  -EC    STG 1 Progress  Ongoing  -EC  Ongoing  -EC    STG 1 Progress Comments  Progressed with weight shifting on theradisks today  -EC  --    STG 2  Pt to dmeonstrate L active DF of 10 deg.  -EC  Pt to dmeonstrate L active DF of 10 deg.  -EC    STG 2 Progress  Ongoing  -EC  Ongoing  -EC       Long Term Goals    LTG Date to Achieve  08/04/20  -EC  08/04/20  -EC    LTG 1  Pt to demonstrate independence with comprehensive HEP.  -EC  Pt to demonstrate independence with comprehensive HEP.  -EC    LTG 1 Progress  Ongoing  -EC  Ongoing  -EC    LTG 2  Pt to demonstrate B hip abductor strength of 4/5.  -EC  Pt to demonstrate B hip abductor strength of 4/5.  -EC    LTG 2 Progress  Ongoing  -EC  Ongoing  -EC    LTG 3  Pt to score <10 on the LEFS.  -EC  Pt to score <10 on the LEFS.  -EC    LTG 3 Progress  Ongoing  -EC  Ongoing  -EC    LTG 4  Pt is able to ambulate community distances without the CAM boot while demonstrating good gait mechanics.  -EC  Pt is able to ambulate community distances without the CAM boot while demonstrating good gait mechanics.  -EC    LTG 4 Progress  Ongoing  -EC  Ongoing  -EC    LTG 4 Progress Comments  Pt still presents with antalgic gait  -EC  --       Time Calculation    PT Goal Re-Cert Due Date  07/23/20  -EC  07/23/20  -EC      User Key  (r) = Recorded By, (t) = Taken By, (c) = Cosigned By    Initials Name Provider Type    Dmitriy Lenz PTA Physical Therapy Assistant          Therapy Education  Education Details: standing doorway DF stretch  Given: Symptoms/condition management, HEP  How Provided:  Verbal  Provided to: Patient  Level of Understanding: Verbalized              Time Calculation:                    Dmitriy Call, PTA  7/13/2020

## 2020-07-16 ENCOUNTER — TREATMENT (OUTPATIENT)
Dept: PHYSICAL THERAPY | Facility: CLINIC | Age: 60
End: 2020-07-16

## 2020-07-16 DIAGNOSIS — M76.62 ACHILLES TENDINITIS OF LEFT LOWER EXTREMITY: Primary | ICD-10-CM

## 2020-07-16 PROCEDURE — 97140 MANUAL THERAPY 1/> REGIONS: CPT | Performed by: PHYSICAL THERAPIST

## 2020-07-16 PROCEDURE — 97110 THERAPEUTIC EXERCISES: CPT | Performed by: PHYSICAL THERAPIST

## 2020-07-16 NOTE — PROGRESS NOTES
Outpatient Physical Therapy Ortho Treatment Note       Patient Name: Taylor Lopez  : 1960  MRN: [id number]  Today's Date: 2020      Visit Date: 2020    Visit Dx:    ICD-10-CM ICD-9-CM   1. Achilles tendinitis of left lower extremity M76.62 726.71       Patient Active Problem List   Diagnosis   • Acute dermatitis        Past Medical History:   Diagnosis Date   • Anxiety    • Elevated cholesterol    • Gallbladder abscess    • Hypertension         Past Surgical History:   Procedure Laterality Date   • CHOLECYSTECTOMY     • FRACTURE SURGERY     • OVARIAN CYST DRAINAGE/EXCISION                         PT Assessment/Plan     Row Name 20          PT Assessment    Assessment Comments  She is more guarded on the lateral side of her gastrocs than her medial. We worked to increase L ankle ROM, B hip strength, and WSing ability. Her L ankle is severely inverted at all times but is more prominent in WBing. She is unable to DF past neutral during active heel strike to toe off.   -        PT Plan    PT Plan Comments  Continue to strengthen hips and increase L ankle ROM   -       User Key  (r) = Recorded By, (t) = Taken By, (c) = Cosigned By    Initials Name Provider Type    Michela Dolan PTA Physical Therapy Assistant            OP Exercises     Row Name 20             Subjective Comments    Subjective Comments  Pt. reports pain in her L knee and her back hurts. She continues to wear the boot when she goes out and is doing a lot at home.   -         Subjective Pain    Able to rate subjective pain?  yes  -      Pre-Treatment Pain Level  2 knee 3/10; back 4/10 back  -      Post-Treatment Pain Level  2  -         Total Minutes    11072 - PT Therapeutic Exercise Minutes  28  -AH      98711 - PT Manual Therapy Minutes  16  -         Exercise 1    Exercise Name 1  L passive DF stretch  -      Cueing 1  Tactile  -      Time 1  2 min  -         Exercise 2    Exercise  Name 2  L DF on wobble board  -AH      Cueing 2  Verbal;Demo  -AH      Sets 2  2  -AH      Reps 2  20  -AH         Exercise 3    Exercise Name 3  B BKFO  -AH      Cueing 3  Verbal;Tactile  -AH      Sets 3  2  -AH      Reps 3  10  -AH         Exercise 4    Exercise Name 4  B standing hip abd  -AH      Cueing 4  Verbal;Demo  -AH      Sets 4  2  -AH      Reps 4  10  -AH         Exercise 5    Exercise Name 5  B standing hip ext  -AH      Cueing 5  Verbal;Demo  -AH      Sets 5  2  -AH      Reps 5  10  -AH         Exercise 6    Exercise Name 6  A/P WSing onto L LE  -AH      Cueing 6  Verbal;Demo  -AH      Sets 6  2  -AH      Reps 6  10  -AH      Additional Comments  emphasizing heel strike  -AH         Exercise 7    Exercise Name 7  A/P WSing onto L LE w/ R step over cone  -AH      Cueing 7  Verbal;Demo  -AH      Sets 7  2  -AH      Reps 7  10  -AH         Exercise 8    Exercise Name 8  lateral WSing onto L LE on blue oval foam  -AH      Cueing 8  Verbal;Demo  -AH      Sets 8  2  -AH      Reps 8  10  -AH        User Key  (r) = Recorded By, (t) = Taken By, (c) = Cosigned By    Initials Name Provider Type     Michela Ayoub, PTA Physical Therapy Assistant                      Manual Rx (last 36 hours)      Manual Treatments     Row Name 07/16/20 0922             Total Minutes    68878 - PT Manual Therapy Minutes  16  -AH         Manual Rx 1    Manual Rx 1 Location  L posterior calf  -AH      Manual Rx 1 Type  IASTM with edge tool, prone  -AH         Manual Rx 2    Manual Rx 2 Location  L TC  -AH      Manual Rx 2 Type  A/P mobilizations in reclined long sitting  -AH        User Key  (r) = Recorded By, (t) = Taken By, (c) = Cosigned By    Initials Name Provider Type     Michela Ayoub, PTA Physical Therapy Assistant          PT OP Goals     Row Name 07/16/20 0922          PT Short Term Goals    STG Date to Achieve  07/14/20  -AH     STG 1  Pt is able to demonstrate proper weight shifting wihtout CAM boot.  -AH     STG 1  Progress  Ongoing  -     STG 1 Progress Comments  Worked on A/P and lateral WSing today  -     STG 2  Pt to dmeonstrate L active DF of 10 deg.  -     STG 2 Progress  Ongoing  [facility]        Long Term Goals    LTG Date to Achieve  08/04/20  -     LTG 1  Pt to demonstrate independence with comprehensive HEP.  -     LTG 1 Progress  Ongoing  -     LTG 2  Pt to demonstrate B hip abductor strength of 4/5.  -     LTG 2 Progress  Ongoing  -     LTG 3  Pt to score <10 on the LEFS.  -     LTG 3 Progress  Ongoing  -     LTG 4  Pt is able to ambulate community distances without the CAM boot while demonstrating good gait mechanics.  -     LTG 4 Progress  Ongoing  [facility]        Time Calculation    PT Goal Re-Cert Due Date  07/23/20  -       User Key  (r) = Recorded By, (t) = Taken By, (c) = Cosigned By    Initials Name Provider Type    Michela Dolan PTA Physical Therapy Assistant          Therapy Education  Given: Mobility training  Program: Reinforced  How Provided: Verbal, Demonstration  Provided to: Patient  Level of Understanding: Verbalized              Time Calculation:   Total Timed Code Minutes- PT: 44 minute(s)                Michela Ayoub PTA  7/16/2020

## 2020-12-02 ENCOUNTER — DOCUMENTATION (OUTPATIENT)
Dept: PHYSICAL THERAPY | Facility: CLINIC | Age: 60
End: 2020-12-02

## 2020-12-02 DIAGNOSIS — M76.62 ACHILLES TENDINITIS OF LEFT LOWER EXTREMITY: Primary | ICD-10-CM

## 2020-12-02 NOTE — PROGRESS NOTES
I have reviewed the notes, assessments, and/or procedures performed by Michela Ayoub PTA, I concur with her/his documentation of Taylor Lopez.

## 2020-12-02 NOTE — PROGRESS NOTES
Outpatient Physical Therapy Discharge Summary         Patient Name: Taylor Lopez  : 1960  MRN: [id number]    Today's Date: 2020    Visit Dx:    ICD-10-CM ICD-9-CM   1. Achilles tendinitis of left lower extremity  M76.62 726.71       PT OP Goals     Row Name 20 1000          PT Short Term Goals    STG Date to Achieve  20  -     STG 1  Pt is able to demonstrate proper weight shifting wihtout CAM boot.  -     STG 1 Progress  Not Met  -     STG 2  Pt to dmeonstrate L active DF of 10 deg.  -     STG 2 Progress  Not Met  -        Long Term Goals    LTG Date to Achieve  20  -     LTG 1  Pt to demonstrate independence with comprehensive HEP.  -     LTG 1 Progress  Not Met  -     LTG 2  Pt to demonstrate B hip abductor strength of 4/5.  -     LTG 2 Progress  Not Met  -     LTG 3  Pt to score <10 on the LEFS.  -     LTG 3 Progress  Not Met  -     LTG 4  Pt is able to ambulate community distances without the CAM boot while demonstrating good gait mechanics.  -     LTG 4 Progress  Not Met  -       User Key  (r) = Recorded By, (t) = Taken By, (c) = Cosigned By    Initials Name Provider Type    Michela Dolan PTA Physical Therapy Assistant          OP PT Discharge Summary  Date of Discharge: 20  Reason for Discharge: Patient/Caregiver request  Outcomes Achieved: Unable to make functional progress toward goals at this time  Discharge Destination: Home without follow-up  Discharge Instructions/Additional Comments: Pt. had attended therapy to address L achilles pain after being kicked by her dog. When she was here we worked to increase her L ankle mobility, decrease guarding in her calf, strengthen her hips to provide added support, and normalize her gait pattern. Her L foot was very inverted in open chain and in WBing. We worked with her for a little less than a month before she called to cancel all of her appointment, reporting she was cleared by her  doctor.       Time Calculation:                    Michela Ayoub, PTA  12/2/2020

## 2022-12-03 ENCOUNTER — NURSE TRIAGE (OUTPATIENT)
Dept: CALL CENTER | Facility: HOSPITAL | Age: 62
End: 2022-12-03

## 2022-12-03 NOTE — TELEPHONE ENCOUNTER
COVID positive this AM per home test, coughing some, makes sob, but not at rest or being up, denies severe symptoms, does have fever 101.3, recommended UC or walk in clinic, triage done.     Reason for Disposition  • [1] COVID-19 diagnosed by positive lab test (e.g., PCR, rapid self-test kit) AND [2] mild symptoms (e.g., cough, fever, others) AND [3] no complications or SOB    Additional Information  • Negative: SEVERE difficulty breathing (e.g., struggling for each breath, speaks in single words)  • Negative: Difficult to awaken or acting confused (e.g., disoriented, slurred speech)  • Negative: Bluish (or gray) lips or face now  • Negative: Shock suspected (e.g., cold/pale/clammy skin, too weak to stand, low BP, rapid pulse)  • Negative: Sounds like a life-threatening emergency to the triager  • Negative: [1] Diagnosed or suspected COVID-19 AND [2] symptoms lasting 3 or more weeks  • Negative: [1] COVID-19 exposure AND [2] no symptoms  • Negative: COVID-19 vaccine reaction suspected (e.g., fever, headache, muscle aches) occurring 1 to 3 days after getting vaccine  • Negative: COVID-19 vaccine, questions about  • Negative: [1] Lives with someone known to have influenza (flu test positive) AND [2] flu-like symptoms (e.g., cough, runny nose, sore throat, SOB; with or without fever)  • Negative: [1] Adult with possible COVID-19 symptoms AND [2] triager concerned about severity of symptoms or other causes  • Negative: COVID-19 and breastfeeding, questions about  • Negative: SEVERE or constant chest pain or pressure  (Exception: Mild central chest pain, present only when coughing.)  • Negative: MODERATE difficulty breathing (e.g., speaks in phrases, SOB even at rest, pulse 100-120)  • Negative: [1] Headache AND [2] stiff neck (can't touch chin to chest)  • Negative: Oxygen level (e.g., pulse oximetry) 90 percent or lower  • Negative: Chest pain or pressure  (Exception: MILD central chest pain, present only when  "coughing)  • Negative: Patient sounds very sick or weak to the triager  • Negative: MILD difficulty breathing (e.g., minimal/no SOB at rest, SOB with walking, pulse <100)  • Negative: Fever > 103 F (39.4 C)  • Negative: [1] Fever > 101 F (38.3 C) AND [2] age > 60 years  • Negative: [1] Fever > 100.0 F (37.8 C) AND [2] bedridden (e.g., CVA, chronic illness, recovering from surgery)  • Negative: Oxygen level (e.g., pulse oximetry) 91 to 94 percent  • Negative: [1] HIGH RISK for severe COVID complications (e.g., weak immune system, age > 64 years, obesity with BMI 30 or higher, pregnant, chronic lung disease or other chronic medical condition) AND [2] COVID symptoms (e.g., cough, fever)  (Exceptions: Already seen by PCP and no new or worsening symptoms.)  • Negative: [1] HIGH RISK patient AND [2] influenza is widespread in the community AND [3] ONE OR MORE respiratory symptoms: cough, sore throat, runny or stuffy nose  • Negative: [1] HIGH RISK patient AND [2] influenza exposure within the last 7 days AND [3] ONE OR MORE respiratory symptoms: cough, sore throat, runny or stuffy nose  • Negative: Fever present > 3 days (72 hours)  • Negative: [1] Fever returns after gone for over 24 hours AND [2] symptoms worse or not improved  • Negative: [1] Continuous (nonstop) coughing interferes with work or school AND [2] no improvement using cough treatment per Care Advice  • Negative: [1] COVID-19 infection suspected by caller or triager AND [2] mild symptoms (cough, fever, or others) AND [3] negative COVID-19 rapid test  • Negative: Cough present > 3 weeks  • Negative: [1] COVID-19 diagnosed by positive lab test (e.g., PCR, rapid self-test kit) AND [2] NO symptoms (e.g., cough, fever, others)    Answer Assessment - Initial Assessment Questions  1. COVID-19 DIAGNOSIS: \"How do you know that you have COVID?\" (e.g., positive lab test or self-test, diagnosed by doctor or NP/PA, symptoms after exposure).      Home test and fever  2. " "COVID-19 EXPOSURE: \"Was there any known exposure to COVID before the symptoms began?\" CDC Definition of close contact: within 6 feet (2 meters) for a total of 15 minutes or more over a 24-hour period.       no  3. ONSET: \"When did the COVID-19 symptoms start?\"       Started last night  4. WORST SYMPTOM: \"What is your worst symptom?\" (e.g., cough, fever, shortness of breath, muscle aches)      Fever, cough, headache,   5. COUGH: \"Do you have a cough?\" If Yes, ask: \"How bad is the cough?\"        Mild moderate  6. FEVER: \"Do you have a fever?\" If Yes, ask: \"What is your temperature, how was it measured, and when did it start?\"      101.3  7. RESPIRATORY STATUS: \"Describe your breathing?\" (e.g., shortness of breath, wheezing, unable to speak)       Sob with coughing  8. BETTER-SAME-WORSE: \"Are you getting better, staying the same or getting worse compared to yesterday?\"  If getting worse, ask, \"In what way?\"      Worse today  9. HIGH RISK DISEASE: \"Do you have any chronic medical problems?\" (e.g., asthma, heart or lung disease, weak immune system, obesity, etc.)      no  10. VACCINE: \"Have you had the COVID-19 vaccine?\" If Yes, ask: \"Which one, how many shots, when did you get it?\"        no  11. BOOSTER: \"Have you received your COVID-19 booster(s)?\" If Yes, ask: \"Which one and when did you get it?\"        no  12. PREGNANCY: \"Is there any chance you are pregnant?\" \"When was your last menstrual period?\"        no  13. OTHER SYMPTOMS: \"Do you have any other symptoms?\"  (e.g., chills, fatigue, headache, loss of smell or taste, muscle pain, sore throat)        Chills, fatigue, headache , muscle pains,cough  14. O2 SATURATION MONITOR:  \"Do you use an oxygen saturation monitor (pulse oximeter) at home?\" If Yes, ask \"What is your reading (oxygen level) today?\" \"What is your usual oxygen saturation reading?\" (e.g., 95%)        No way to check    Protocols used: CORONAVIRUS (COVID-19) DIAGNOSED OR SUSPECTED-ADULT-AH      "